# Patient Record
Sex: FEMALE | Race: WHITE | NOT HISPANIC OR LATINO | Employment: FULL TIME | URBAN - METROPOLITAN AREA
[De-identification: names, ages, dates, MRNs, and addresses within clinical notes are randomized per-mention and may not be internally consistent; named-entity substitution may affect disease eponyms.]

---
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
## 2023-04-16 ENCOUNTER — HEALTH MAINTENANCE LETTER (OUTPATIENT)
Age: 18
End: 2023-04-16

## 2024-05-18 ENCOUNTER — OFFICE VISIT (OUTPATIENT)
Dept: URGENT CARE | Facility: URGENT CARE | Age: 19
End: 2024-05-18
Payer: COMMERCIAL

## 2024-05-18 VITALS
RESPIRATION RATE: 20 BRPM | OXYGEN SATURATION: 99 % | HEART RATE: 73 BPM | DIASTOLIC BLOOD PRESSURE: 79 MMHG | TEMPERATURE: 99.2 F | SYSTOLIC BLOOD PRESSURE: 118 MMHG | WEIGHT: 123.25 LBS

## 2024-05-18 DIAGNOSIS — S91.209A TRAUMATIC AVULSION OF NAIL PLATE OF TOE, INITIAL ENCOUNTER: ICD-10-CM

## 2024-05-18 DIAGNOSIS — L03.031 PARONYCHIA OF TOE, RIGHT: Primary | ICD-10-CM

## 2024-05-18 PROCEDURE — 99203 OFFICE O/P NEW LOW 30 MIN: CPT | Performed by: INTERNAL MEDICINE

## 2024-05-18 RX ORDER — ALPRAZOLAM 0.5 MG
TABLET ORAL
COMMUNITY
Start: 2023-11-15

## 2024-05-19 NOTE — PROGRESS NOTES
ASSESSMENT AND PLAN:      ICD-10-CM    1. Paronychia of toe, right  L03.031 amoxicillin-clavulanate (AUGMENTIN) 875-125 MG tablet      2. Traumatic avulsion of nail plate of toe, initial encounter  S91.209A amoxicillin-clavulanate (AUGMENTIN) 875-125 MG tablet        Discussed with patient she is at risk for losing her nail.  She describes purulent discharge earlier today and she does have some redness and swelling around the nail.  Discussed redness at the base of the nail could also be related to potentially loosening of the base of the nail itself with lifting off from the skin.  Decision to place on oral antibiotics with recommendation of warm soaks.  Discussed if the nail does not completely fall off and there is partial lifting of the nail having a wet under bed of the nail can put her at risk for fungal infection.    Otherwise expect nail to fall off and new 1 to grow in.    Handouts given on paronychia and traumatic avulsion      Patient Instructions     Expect toe nail may fall off    Start antibiotics 2 x day for 1 week    Warm soaks with epson salt with soaping water.    If ongoing concerns, see Podiatry       Emelia Kuhn MD  [facility]    Subjective     Jennifer Bhandari is a 19 year old who presents for Patient presents with:  Urgent Care: c/o swelling with pain of right big toe with pus drainage - reports her right big toe was stepped on during a basketball game one week ago.     a new patient of [facility].      Onset of symptoms was 1 week(s) ago.    Location: right big toe  Context: She was playing a pickup basketball game while wearing socks.  Another player wearing tennis shoes stepped on her big toe.  She had immediate pain.    She thought she was recovering fine from the injury until she noticed redness of her big toe and a large amount of yellow pussy drainage on her blanket today.  Currently no pain.  Treatment measures tried include: Wash area and showered today  to clean off the discharge.  Relief from treatment: minor    Review of Systems        Objective    /79   Pulse 73   Temp 99.2  F (37.3  C) (Tympanic)   Resp 20   Wt 55.9 kg (123 lb 4 oz)   SpO2 99%   Physical Exam  Vitals reviewed.   Constitutional:       Appearance: Normal appearance.   Musculoskeletal:      Comments: Right foot.  Big toe with surrounding redness and swelling  Nail appears to be lifting up from skin.  Clear fluid easily coming from under the nail.       Neurological:      Mental Status: She is alert.

## 2024-05-19 NOTE — PATIENT INSTRUCTIONS
Expect toe nail may fall off    Start antibiotics 2 x day for 1 week    Warm soaks with epson salt with soaping water.    If ongoing concerns, see Podiatry

## 2024-05-21 ENCOUNTER — OFFICE VISIT (OUTPATIENT)
Dept: URGENT CARE | Facility: URGENT CARE | Age: 19
End: 2024-05-21
Payer: COMMERCIAL

## 2024-05-21 VITALS
OXYGEN SATURATION: 97 % | HEART RATE: 70 BPM | SYSTOLIC BLOOD PRESSURE: 119 MMHG | WEIGHT: 121 LBS | TEMPERATURE: 98.6 F | DIASTOLIC BLOOD PRESSURE: 77 MMHG | RESPIRATION RATE: 18 BRPM

## 2024-05-21 DIAGNOSIS — J06.9 UPPER RESPIRATORY TRACT INFECTION, UNSPECIFIED TYPE: Primary | ICD-10-CM

## 2024-05-21 DIAGNOSIS — J01.90 ACUTE NON-RECURRENT SINUSITIS, UNSPECIFIED LOCATION: ICD-10-CM

## 2024-05-21 PROCEDURE — 99213 OFFICE O/P EST LOW 20 MIN: CPT | Performed by: FAMILY MEDICINE

## 2024-05-21 RX ORDER — CEPHALEXIN 500 MG/1
500 CAPSULE ORAL 3 TIMES DAILY
Qty: 21 CAPSULE | Refills: 0 | Status: SHIPPED | OUTPATIENT
Start: 2024-05-21 | End: 2024-05-28

## 2024-05-21 NOTE — PROGRESS NOTES
(J06.9) Upper respiratory tract infection, unspecified type  (primary encounter diagnosis)  Comment:   Plan: cephALEXin (KEFLEX) 500 MG capsule            (J01.90) Acute non-recurrent sinusitis, unspecified location  Comment:   Plan: cephALEXin (KEFLEX) 500 MG capsule          Additionally recommended antihistamine such as generic Claritin or Zyrtec.      CHIEF COMPLAINT    Respiratory congestion, ear discomfort.      HISTORY    She has a variety of symptoms over the last week.  She has had congestion.  She has pressure on her ears.  She has a sore throat.  She has cough with some mucousy sputum production.  Has felt feverish.      REVIEW OF SYSTEMS    No wheezing or short of breath.  No nausea, vomiting, diarrhea.  No rash.      EXAM  /77   Pulse 70   Temp 98.6  F (37  C) (Tympanic)   Resp 18   Wt 54.9 kg (121 lb)   SpO2 97%     Tympanic membrane's WNL.  Congested nasal membranes.  Pharynx without redness or swelling.  Mildly enlarged anterior cervical nodes.  Lungs clear.

## 2024-05-21 NOTE — LETTER
May 21, 2024        Jennifer Cindy Luisith        Unable to work May 20, 21 and possibly May 22 due to illness.            Reuben Kelsey MD on 5/21/2024 at 11:32 AM

## 2024-06-23 ENCOUNTER — HEALTH MAINTENANCE LETTER (OUTPATIENT)
Age: 19
End: 2024-06-23

## 2025-07-12 ENCOUNTER — HEALTH MAINTENANCE LETTER (OUTPATIENT)
Age: 20
End: 2025-07-12

## 2025-07-12 ENCOUNTER — OFFICE VISIT (OUTPATIENT)
Dept: URGENT CARE | Facility: URGENT CARE | Age: 20
End: 2025-07-12
Payer: COMMERCIAL

## 2025-07-12 VITALS
WEIGHT: 124 LBS | SYSTOLIC BLOOD PRESSURE: 108 MMHG | DIASTOLIC BLOOD PRESSURE: 77 MMHG | TEMPERATURE: 98.2 F | OXYGEN SATURATION: 100 % | HEART RATE: 76 BPM | RESPIRATION RATE: 16 BRPM

## 2025-07-12 DIAGNOSIS — N92.1 BREAKTHROUGH BLEEDING ON NEXPLANON: Primary | ICD-10-CM

## 2025-07-12 DIAGNOSIS — Z97.5 BREAKTHROUGH BLEEDING ON NEXPLANON: Primary | ICD-10-CM

## 2025-07-12 DIAGNOSIS — N92.1 MENORRHAGIA WITH IRREGULAR CYCLE: ICD-10-CM

## 2025-07-12 LAB — HCG UR QL: NEGATIVE

## 2025-07-12 PROCEDURE — 81025 URINE PREGNANCY TEST: CPT | Performed by: FAMILY MEDICINE

## 2025-07-12 PROCEDURE — 99213 OFFICE O/P EST LOW 20 MIN: CPT | Performed by: FAMILY MEDICINE

## 2025-07-12 RX ORDER — MEDROXYPROGESTERONE ACETATE 10 MG
10 TABLET ORAL DAILY
Qty: 5 TABLET | Refills: 0 | Status: SHIPPED | OUTPATIENT
Start: 2025-07-12 | End: 2025-07-17

## 2025-07-12 NOTE — PROGRESS NOTES
Urgent Care Clinic Visit  Rapid rooming was initiated.  Provider was consulted, patient will remain in room and provider will be with patient as soon as possible.  Chief Complaint   Patient presents with    Abnormal Bleeding Problem     Pt states that today she woke up with heavy bleeding and having to change her pad once every hour. Pt has had no period for over a month in a half               7/12/2025     6:30 PM   Additional Questions   Roomed by Ethan   Accompanied by Partner

## 2025-07-12 NOTE — PROGRESS NOTES
Assessment & Plan     Menorrhagia with irregular cycle  - medroxyPROGESTERone (PROVERA) 10 MG tablet  Dispense: 5 tablet; Refill: 0  - HCG qualitative urine  - HCG qualitative urine    Breakthrough bleeding on Nexplanon  - medroxyPROGESTERone (PROVERA) 10 MG tablet  Dispense: 5 tablet; Refill: 0    She has another breakthrough bleeding.  Acutely, will treat with 5 days of provera.  Neg UPT.   Advised that she follow up with her OB/GYN regarding Nexplanon.  Given that it was recently placed, encouraged pt to be patience and keep the Nexplanon for at least 4-6 months while managing breakthrough bleeding with provera prn.           Return in about 1 week (around 7/19/2025) for Follow up with your OB/GYN.    Renee Pepper MD  [facility]     Jennifer is a 20 year old female who presents to clinic today for the following health issues:  Chief Complaint   Patient presents with    Abnormal Bleeding Problem     Pt states that today she woke up with heavy bleeding and having to change her pad once every hour. Pt has had no period for over a month in a half         7/12/2025     6:30 PM   Additional Questions   Roomed by Ethan   Accompanied by Partner     HPI  As above. Known hx of irregular menstrual bleeding and breakthrough bleeding.  Nexplanon placed in mid May 2025 by OB/GYN.      Mild suprapubic cramps.      Questioning if she should keep the Nexplanon.     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     Concerns that she might be pregnant.     Review of Systems  Constitutional, HEENT, cardiovascular, pulmonary, gi and gu systems are negative, except as otherwise noted.      Objective    /77 (BP Location: Right arm, Patient Position: Sitting)   Pulse 76   Temp 98.2  F (36.8  C) (Tympanic)   Resp 16   Wt 56.2 kg (124 lb)   SpO2 100%   Physical Exam   GENERAL: alert and no distress  CV: regular rate and rhythm, normal S1 S2, no S3 or S4, no murmur, click or rub, no  peripheral edema  ABDOMEN: soft, nontender, no hepatosplenomegaly, no masses and bowel sounds normal    Recent Results (from the past 24 hours)   HCG qualitative urine   Result Value Ref Range    hCG Urine Qualitative Negative Negative